# Patient Record
Sex: FEMALE | Race: WHITE | NOT HISPANIC OR LATINO | Employment: FULL TIME | URBAN - METROPOLITAN AREA
[De-identification: names, ages, dates, MRNs, and addresses within clinical notes are randomized per-mention and may not be internally consistent; named-entity substitution may affect disease eponyms.]

---

## 2021-09-07 ENCOUNTER — HOSPITAL ENCOUNTER (EMERGENCY)
Facility: HOSPITAL | Age: 46
Discharge: HOME | End: 2021-09-08
Attending: FAMILY MEDICINE | Admitting: FAMILY MEDICINE
Payer: COMMERCIAL

## 2021-09-07 ENCOUNTER — APPOINTMENT (OUTPATIENT)
Dept: RADIOLOGY | Facility: HOSPITAL | Age: 46
End: 2021-09-07
Attending: FAMILY MEDICINE
Payer: COMMERCIAL

## 2021-09-07 DIAGNOSIS — R06.01 ORTHOPNEA: ICD-10-CM

## 2021-09-07 DIAGNOSIS — E87.6 HYPOKALEMIA: ICD-10-CM

## 2021-09-07 DIAGNOSIS — R07.9 CHEST PAIN, UNSPECIFIED TYPE: ICD-10-CM

## 2021-09-07 DIAGNOSIS — E83.42 HYPOMAGNESEMIA: ICD-10-CM

## 2021-09-07 LAB
ANION GAP SERPL CALCULATED.3IONS-SCNC: 8 MMOL/L (ref 5–18)
ATRIAL RATE - MUSE: 48 BPM
BASOPHILS # BLD AUTO: 0.1 10E3/UL (ref 0–0.2)
BASOPHILS NFR BLD AUTO: 1 %
BNP SERPL-MCNC: 138 PG/ML (ref 0–64)
BUN SERPL-MCNC: 8 MG/DL (ref 8–22)
CALCIUM SERPL-MCNC: 8.3 MG/DL (ref 8.5–10.5)
CHLORIDE BLD-SCNC: 107 MMOL/L (ref 98–107)
CO2 SERPL-SCNC: 24 MMOL/L (ref 22–31)
CREAT SERPL-MCNC: 0.71 MG/DL (ref 0.6–1.1)
D DIMER PPP FEU-MCNC: 0.64 UG/ML FEU (ref 0–0.5)
DIASTOLIC BLOOD PRESSURE - MUSE: NORMAL MMHG
EOSINOPHIL # BLD AUTO: 0.2 10E3/UL (ref 0–0.7)
EOSINOPHIL NFR BLD AUTO: 2 %
ERYTHROCYTE [DISTWIDTH] IN BLOOD BY AUTOMATED COUNT: 13.6 % (ref 10–15)
GFR SERPL CREATININE-BSD FRML MDRD: >90 ML/MIN/1.73M2
GLUCOSE BLD-MCNC: 79 MG/DL (ref 70–125)
HCT VFR BLD AUTO: 32 % (ref 35–47)
HGB BLD-MCNC: 10.9 G/DL (ref 11.7–15.7)
IMM GRANULOCYTES # BLD: 0 10E3/UL
IMM GRANULOCYTES NFR BLD: 0 %
INTERPRETATION ECG - MUSE: NORMAL
LYMPHOCYTES # BLD AUTO: 2.3 10E3/UL (ref 0.8–5.3)
LYMPHOCYTES NFR BLD AUTO: 31 %
MAGNESIUM SERPL-MCNC: 1.6 MG/DL (ref 1.8–2.6)
MCH RBC QN AUTO: 32.2 PG (ref 26.5–33)
MCHC RBC AUTO-ENTMCNC: 34.1 G/DL (ref 31.5–36.5)
MCV RBC AUTO: 94 FL (ref 78–100)
MONOCYTES # BLD AUTO: 0.6 10E3/UL (ref 0–1.3)
MONOCYTES NFR BLD AUTO: 9 %
NEUTROPHILS # BLD AUTO: 4.2 10E3/UL (ref 1.6–8.3)
NEUTROPHILS NFR BLD AUTO: 57 %
NRBC # BLD AUTO: 0 10E3/UL
NRBC BLD AUTO-RTO: 0 /100
P AXIS - MUSE: 35 DEGREES
PLATELET # BLD AUTO: 224 10E3/UL (ref 150–450)
POTASSIUM BLD-SCNC: 3.2 MMOL/L (ref 3.5–5)
PR INTERVAL - MUSE: 182 MS
QRS DURATION - MUSE: 86 MS
QT - MUSE: 424 MS
QTC - MUSE: 378 MS
R AXIS - MUSE: 78 DEGREES
RBC # BLD AUTO: 3.39 10E6/UL (ref 3.8–5.2)
SODIUM SERPL-SCNC: 139 MMOL/L (ref 136–145)
SYSTOLIC BLOOD PRESSURE - MUSE: NORMAL MMHG
T AXIS - MUSE: 34 DEGREES
TROPONIN I SERPL-MCNC: <0.01 NG/ML (ref 0–0.29)
VENTRICULAR RATE- MUSE: 48 BPM
WBC # BLD AUTO: 7.4 10E3/UL (ref 4–11)

## 2021-09-07 PROCEDURE — 71046 X-RAY EXAM CHEST 2 VIEWS: CPT

## 2021-09-07 PROCEDURE — 80048 BASIC METABOLIC PNL TOTAL CA: CPT | Performed by: FAMILY MEDICINE

## 2021-09-07 PROCEDURE — 84484 ASSAY OF TROPONIN QUANT: CPT | Performed by: FAMILY MEDICINE

## 2021-09-07 PROCEDURE — 99285 EMERGENCY DEPT VISIT HI MDM: CPT | Mod: 25

## 2021-09-07 PROCEDURE — 83735 ASSAY OF MAGNESIUM: CPT | Performed by: FAMILY MEDICINE

## 2021-09-07 PROCEDURE — 85025 COMPLETE CBC W/AUTO DIFF WBC: CPT | Performed by: FAMILY MEDICINE

## 2021-09-07 PROCEDURE — 93005 ELECTROCARDIOGRAM TRACING: CPT

## 2021-09-07 PROCEDURE — 87635 SARS-COV-2 COVID-19 AMP PRB: CPT | Performed by: FAMILY MEDICINE

## 2021-09-07 PROCEDURE — 93005 ELECTROCARDIOGRAM TRACING: CPT | Performed by: FAMILY MEDICINE

## 2021-09-07 PROCEDURE — 85379 FIBRIN DEGRADATION QUANT: CPT | Performed by: FAMILY MEDICINE

## 2021-09-07 PROCEDURE — 36415 COLL VENOUS BLD VENIPUNCTURE: CPT | Performed by: FAMILY MEDICINE

## 2021-09-07 PROCEDURE — 83880 ASSAY OF NATRIURETIC PEPTIDE: CPT | Performed by: FAMILY MEDICINE

## 2021-09-07 RX ORDER — IPRATROPIUM BROMIDE AND ALBUTEROL SULFATE 2.5; .5 MG/3ML; MG/3ML
3 SOLUTION RESPIRATORY (INHALATION) ONCE
Status: DISCONTINUED | OUTPATIENT
Start: 2021-09-07 | End: 2021-09-08 | Stop reason: HOSPADM

## 2021-09-07 ASSESSMENT — MIFFLIN-ST. JEOR: SCORE: 1124.42

## 2021-09-07 NOTE — Clinical Note
Jocelyne Angel was seen and treated in our emergency department on 9/7/2021.  She may return to work on 09/09/2021.       If you have any questions or concerns, please don't hesitate to call.      Ivan Dodson MD

## 2021-09-08 ENCOUNTER — APPOINTMENT (OUTPATIENT)
Dept: ULTRASOUND IMAGING | Facility: HOSPITAL | Age: 46
End: 2021-09-08
Attending: FAMILY MEDICINE
Payer: COMMERCIAL

## 2021-09-08 VITALS
WEIGHT: 116 LBS | OXYGEN SATURATION: 100 % | TEMPERATURE: 98.5 F | HEART RATE: 46 BPM | DIASTOLIC BLOOD PRESSURE: 97 MMHG | BODY MASS INDEX: 21.35 KG/M2 | HEIGHT: 62 IN | RESPIRATION RATE: 16 BRPM | SYSTOLIC BLOOD PRESSURE: 145 MMHG

## 2021-09-08 LAB — SARS-COV-2 RNA RESP QL NAA+PROBE: NEGATIVE

## 2021-09-08 PROCEDURE — 250N000013 HC RX MED GY IP 250 OP 250 PS 637: Performed by: FAMILY MEDICINE

## 2021-09-08 PROCEDURE — 93970 EXTREMITY STUDY: CPT

## 2021-09-08 RX ORDER — POTASSIUM CHLORIDE 1.5 G/1.58G
20 POWDER, FOR SOLUTION ORAL ONCE
Status: COMPLETED | OUTPATIENT
Start: 2021-09-08 | End: 2021-09-08

## 2021-09-08 RX ORDER — MAGNESIUM OXIDE 400 MG/1
400 TABLET ORAL ONCE
Status: COMPLETED | OUTPATIENT
Start: 2021-09-08 | End: 2021-09-08

## 2021-09-08 RX ADMIN — MAGNESIUM OXIDE TAB 400 MG (241.3 MG ELEMENTAL MG) 400 MG: 400 (241.3 MG) TAB at 02:09

## 2021-09-08 RX ADMIN — POTASSIUM CHLORIDE FOR ORAL SOLUTION 20 MEQ: 1.5 POWDER, FOR SOLUTION ORAL at 02:09

## 2021-09-08 ASSESSMENT — ENCOUNTER SYMPTOMS
COUGH: 0
FEVER: 0
CHEST TIGHTNESS: 1
CHILLS: 0

## 2021-09-08 NOTE — ED PROVIDER NOTES
EMERGENCY DEPARTMENT ENCOUNTER      NAME: Jocelyne Angel  AGE: 46 year old female  YOB: 1975  MRN: [id number]  EVALUATION DATE & TIME: No admission date for patient encounter.    PCP: No primary care provider on file.    ED PROVIDER: Ivan Dodson M.D.    Chief Complaint   Patient presents with     Chest Pain       FINAL IMPRESSION:  1. Chest pain, unspecified type    2. Orthopnea    3. Hypomagnesemia    4. Hypokalemia        ED COURSE & MEDICAL DECISION MAKING:    Pertinent Labs & Imaging studies personally reviewed and interpreted by me. (See chart for details)  9:33 PM Patient seen and examined, prior records reviewed. PPE worn throughout all patient encounters; surgical mask, gloves.  Differential diagnosis includes but not limited to COPD, asthma, CHF, pneumonia, bronchitis, pneumothorax, myocardial infarction, pulmonary embolism, anxiety.  Patient presents with a week of shortness of breath and mild bilateral lower extremity swelling.  Patient was seen for this same 2 days ago, had labs and a CT PE protocol that was negative for acute pulmonary embolism.  Labs ordered, EKG is reassuring.  11:23 PM labs are reassuring, troponin is negative.  Mild anemia, mild hypokalemia and hypomagnesemia.  Chest x-ray without findings for heart failure, BNP is minimally elevated.  Bilateral lower extremity edema with positive D-dimer, ultrasound is ordered to further evaluate.  Patient refused her nebulizer treatment.  Given the patient's had ongoing symptoms and had a negative troponin a couple days ago, single troponin is adequate for rule out.  She does have some borderline bradycardia.  2:02 AM lower extremity ultrasound is negative for acute findings.  Patient is stable for discharge with outpatient follow-up with cardiology.  Consider echocardiogram.  Review of prior CT scan demonstrates peribronchial cuffing consistent with bronchitis.  Patient does not believe that the shortness of breath and  chest pressure she is having are from her lungs and does not want nebulizer treatments or further treatment for bronchitis       At the conclusion of the encounter I discussed the results of all of the tests and the disposition. The questions were answered. The patient or family acknowledged understanding and was agreeable with the care plan.     PROCEDURES:   Procedures    MEDICATIONS GIVEN IN THE EMERGENCY:  Medications   ipratropium - albuterol 0.5 mg/2.5 mg/3 mL (DUONEB) neb solution 3 mL (0 mLs Nebulization Hold 9/7/21 2325)   potassium chloride (KLOR-CON) Packet 20 mEq (20 mEq Oral Given 9/8/21 0209)   magnesium oxide (MAG-OX) tablet 400 mg (400 mg Oral Given 9/8/21 0209)       NEW PRESCRIPTIONS STARTED AT TODAY'S ER VISIT  Discharge Medication List as of 9/8/2021  2:13 AM          =================================================================    HPI    Patient information was obtained from: patient      Jocelyne Angel is a 46 year old female with a pertinent history of asthma who presents to this ED for evaluation of chest discomfort. Patient reports that one week ago she began having ankle swelling and chest heaviness. She states that she finds it hard to breath and lay down due to her chest discomfort. She denies fever, chills, cough, recent cold symptoms, or any additional symptoms at this time. Patient has reported hx of anemia, no regular medications, and allergy to Percocet and Tylenol. She took Advil today to manage her symptoms. She supposed to see her primary on Thursday. Patient is not COVID vaccinated.     REVIEW OF SYSTEMS   Review of Systems   Constitutional: Negative for chills and fever.   HENT:        Negative for cold symptoms   Respiratory: Positive for chest tightness. Negative for cough.         Positive for dyspnea   Musculoskeletal:        Positive for bilateral ankle swelling     All other systems reviewed and negative    PAST MEDICAL HISTORY:  Past Medical History:   Diagnosis  Date     Abnormal glandular Papanicolaou smear of cervix 2002 2002 with Colpo. Normal since     Chlamydia trachomatis infection of other specified site 2004    Tx'd     Depressive disorder, not elsewhere classified 2005    On Lexapro x 2weeks. PPD?     Mild intermittent asthma     Asthma, albuterol inhaler     Mitral valve disorders(424.0)     Mild, No tx, No sx.       PAST SURGICAL HISTORY:  Past Surgical History:   Procedure Laterality Date     C LIGATE FALLOPIAN TUBE      Description: Tubal Ligation;  Recorded: 03/26/2012;     HC DILATION/CURETTAGE DIAG/THER NON OB  2001    D & C, after SAB     SHOULDER SURGERY       SURGICAL HISTORY OF -   12/2/05    rotator cuff repair       CURRENT MEDICATIONS:    Current Facility-Administered Medications   Medication     ipratropium - albuterol 0.5 mg/2.5 mg/3 mL (DUONEB) neb solution 3 mL     Current Outpatient Medications   Medication     BUPROPION HCL### 150 MG OR TBCR     BUSPAR 15 MG OR TABS     CELEXA 40 MG OR TABS     TEMAZEPAM 15 MG OR CAPS     XANAX 0.25 MG OR TABS       ALLERGIES:  Allergies   Allergen Reactions     Percocet [Oxycodone-Acetaminophen] Hives     Tylenol/Codeine [Codeine] Itching       FAMILY HISTORY:  Family History   Problem Relation Age of Onset     Cerebrovascular Disease Paternal Grandmother      C.A.D. Paternal Grandmother         mi     Cancer Paternal Grandfather      Genitourinary Problems Mother         renal stones       SOCIAL HISTORY:   Social History     Socioeconomic History     Marital status: Single     Spouse name: Not on file     Number of children: 2     Years of education: Not on file     Highest education level: Not on file   Occupational History     Employer: UNEMPLOYED   Tobacco Use     Smoking status: Light Tobacco Smoker     Packs/day: 0.10     Types: Cigarettes     Tobacco comment: still socially   Substance and Sexual Activity     Alcohol use: Yes     Comment: Alcoholic Drinks/day: Occassionally      Drug use: No      "Sexual activity: Yes     Partners: Male     Birth control/protection: Condom, Pill     Comment: partner uses comdom. Hx chlamydia last pregnancy.    Other Topics Concern     Not on file   Social History Narrative    Caffeine intake/servings daily - 0-1    Calcium intake/servings daily - 3    Exercise active times weekly - describe     Sunscreen used - Yes    Seatbelts used - Yes    Guns stored in the home - No    Self Breast Exam - Yes    Pap test up to date -  No    Eye exam up to date -  Yes    Dental exam up to date -  Yes    DEXA scan up to date -  Not Applicable    Flex Sig/Colonoscopy up to date -  Not Applicable    Mammography up to date -  Not Applicable    Immunizations reviewed and up to date - Yes    Abuse: Current or Past (Physical, Sexual or Emotional) - No    Do you feel safe in your environment - Yes    Do you cope well with stress - No    Do you suffer from insomnia - Yes    Last updated by: Mckayla Gan  3/14/2005     Social Determinants of Health     Financial Resource Strain:      Difficulty of Paying Living Expenses:    Food Insecurity:      Worried About Running Out of Food in the Last Year:      Ran Out of Food in the Last Year:    Transportation Needs:      Lack of Transportation (Medical):      Lack of Transportation (Non-Medical):    Physical Activity:      Days of Exercise per Week:      Minutes of Exercise per Session:    Stress:      Feeling of Stress :    Social Connections:      Frequency of Communication with Friends and Family:      Frequency of Social Gatherings with Friends and Family:      Attends Anabaptism Services:      Active Member of Clubs or Organizations:      Attends Club or Organization Meetings:      Marital Status:    Intimate Partner Violence:      Fear of Current or Ex-Partner:      Emotionally Abused:      Physically Abused:      Sexually Abused:        VITALS:  BP (!) 145/97   Pulse (!) 46   Temp 98.5  F (36.9  C) (Temporal)   Resp 16   Ht 1.575 m (5' 2\")   Wt " 52.6 kg (116 lb)   LMP 09/01/2021   SpO2 100%   BMI 21.22 kg/m      PHYSICAL EXAM:  Physical Exam  Vitals and nursing note reviewed.   Constitutional:       Appearance: Normal appearance.   HENT:      Head: Normocephalic and atraumatic.      Right Ear: External ear normal.      Left Ear: External ear normal.      Nose: Nose normal.      Mouth/Throat:      Mouth: Mucous membranes are moist.   Eyes:      Extraocular Movements: Extraocular movements intact.      Conjunctiva/sclera: Conjunctivae normal.      Pupils: Pupils are equal, round, and reactive to light.   Cardiovascular:      Rate and Rhythm: Normal rate and regular rhythm.   Pulmonary:      Effort: Pulmonary effort is normal.      Breath sounds: No wheezing or rales.      Comments: Decreased breathe sounds bilaterally.  Abdominal:      General: Abdomen is flat. There is no distension.      Palpations: Abdomen is soft.      Tenderness: There is no abdominal tenderness. There is no guarding.   Musculoskeletal:         General: Normal range of motion.      Cervical back: Normal range of motion and neck supple.      Right lower leg: No edema.      Left lower leg: No edema.   Lymphadenopathy:      Cervical: No cervical adenopathy.   Skin:     General: Skin is warm and dry.   Neurological:      General: No focal deficit present.      Mental Status: She is alert and oriented to person, place, and time. Mental status is at baseline.      Comments: No gross focal neurologic deficits   Psychiatric:         Mood and Affect: Mood normal.         Behavior: Behavior normal.         Thought Content: Thought content normal.       LAB:  All pertinent labs reviewed and interpreted.  Results for orders placed or performed during the hospital encounter of 09/07/21   Chest XR,  PA & LAT    Impression    IMPRESSION: Bronchial wall thickening noted on CT not evident by plain film. No focal infiltrate. Heart and central vessels unremarkable. Remote right and left rotator cuff  cuff repair.   US Lower Extremity Venous Duplex Bilateral    Impression    IMPRESSION:  1.  No deep venous thrombosis in the bilateral lower extremities.   Basic metabolic panel   Result Value Ref Range    Sodium 139 136 - 145 mmol/L    Potassium 3.2 (L) 3.5 - 5.0 mmol/L    Chloride 107 98 - 107 mmol/L    Carbon Dioxide (CO2) 24 22 - 31 mmol/L    Anion Gap 8 5 - 18 mmol/L    Urea Nitrogen 8 8 - 22 mg/dL    Creatinine 0.71 0.60 - 1.10 mg/dL    Calcium 8.3 (L) 8.5 - 10.5 mg/dL    Glucose 79 70 - 125 mg/dL    GFR Estimate >90 >60 mL/min/1.73m2   Result Value Ref Range    Troponin I <0.01 0.00 - 0.29 ng/mL   B-Type Natriuretic Peptide ([facility] Only)   Result Value Ref Range     (H) 0 - 64 pg/mL   Result Value Ref Range    Magnesium 1.6 (L) 1.8 - 2.6 mg/dL   Symptomatic COVID-19 Virus (Coronavirus) by PCR Nasopharyngeal    Specimen: Nasopharyngeal; Swab   Result Value Ref Range    SARS CoV2 PCR Negative Negative   D dimer quantitative   Result Value Ref Range    D-Dimer Quantitative 0.64 (H) 0.00 - 0.50 ug/mL FEU   CBC with platelets and differential   Result Value Ref Range    WBC Count 7.4 4.0 - 11.0 10e3/uL    RBC Count 3.39 (L) 3.80 - 5.20 10e6/uL    Hemoglobin 10.9 (L) 11.7 - 15.7 g/dL    Hematocrit 32.0 (L) 35.0 - 47.0 %    MCV 94 78 - 100 fL    MCH 32.2 26.5 - 33.0 pg    MCHC 34.1 31.5 - 36.5 g/dL    RDW 13.6 10.0 - 15.0 %    Platelet Count 224 150 - 450 10e3/uL    % Neutrophils 57 %    % Lymphocytes 31 %    % Monocytes 9 %    % Eosinophils 2 %    % Basophils 1 %    % Immature Granulocytes 0 %    NRBCs per 100 WBC 0 <1 /100    Absolute Neutrophils 4.2 1.6 - 8.3 10e3/uL    Absolute Lymphocytes 2.3 0.8 - 5.3 10e3/uL    Absolute Monocytes 0.6 0.0 - 1.3 10e3/uL    Absolute Eosinophils 0.2 0.0 - 0.7 10e3/uL    Absolute Basophils 0.1 0.0 - 0.2 10e3/uL    Absolute Immature Granulocytes 0.0 <=0.0 10e3/uL    Absolute NRBCs 0.0 10e3/uL   ECG 12-LEAD WITH MUSE ([facility])   Result Value Ref Range    Systolic Blood Pressure   mmHg    Diastolic Blood Pressure  mmHg    Ventricular Rate 48 BPM    Atrial Rate 48 BPM    NV Interval 182 ms    QRS Duration 86 ms     ms    QTc 378 ms    P Axis 35 degrees    R AXIS 78 degrees    T Axis 34 degrees    Interpretation ECG       Sinus bradycardia  Abnormal ECG  When compared with ECG of 08-APR-2006 16:45,  Vent. rate has decreased BY  50 BPM  T wave inversion no longer evident in Inferior leads  T wave inversion no longer evident in Anterior leads  QT has shortened  Confirmed by SEE ED PROVIDER NOTE FOR, ECG INTERPRETATION ([id number]),  GISELLE VILLANUEVA ([id number]) on 9/7/2021 10:42:15 PM         RADIOLOGY:  Reviewed all pertinent imaging. Please see official radiology report.  US Lower Extremity Venous Duplex Bilateral   Final Result   IMPRESSION:   1.  No deep venous thrombosis in the bilateral lower extremities.      Chest XR,  PA & LAT   Final Result   IMPRESSION: Bronchial wall thickening noted on CT not evident by plain film. No focal infiltrate. Heart and central vessels unremarkable. Remote right and left rotator cuff cuff repair.          EKG:    Performed at: 11:13 PM  Impression: Sinus bradycardia, otherwise normal  Rate: 48  Rhythm: Sinus  Axis: Normal  NV Interval: 182  QRS Interval: 86  QTc Interval: 378  ST Changes: No acute ischemic changes  Comparison: Prior of April 2006, rate has decreased and previously seen T wave inversions are absent    I have independently reviewed and interpreted the EKG(s) documented above.    I, Sarai De La Vega, am serving as a scribe to document services personally performed by Dr. Dodson based on my observation and the provider's statements to me. I, Ivan Dodson MD attest that Sarai De La Vega is acting in a scribe capacity, has observed my performance of the services and has documented them in accordance with my direction.    Ivan Dodson M.D.  Emergency Medicine  [facility] EMERGENCY  DEPARTMENT  [address] [place] [id number]  [phone]  Dept: [phone]     Ivan Dodson MD  09/08/21 0204       Ivan Dodson MD  09/08/21 9426

## 2021-09-10 ENCOUNTER — OFFICE VISIT (OUTPATIENT)
Dept: CARDIOLOGY | Facility: CLINIC | Age: 46
End: 2021-09-10
Attending: FAMILY MEDICINE
Payer: COMMERCIAL

## 2021-09-10 VITALS
DIASTOLIC BLOOD PRESSURE: 74 MMHG | HEART RATE: 64 BPM | RESPIRATION RATE: 14 BRPM | WEIGHT: 123.9 LBS | BODY MASS INDEX: 22.66 KG/M2 | SYSTOLIC BLOOD PRESSURE: 122 MMHG

## 2021-09-10 DIAGNOSIS — R07.9 CHEST PAIN, UNSPECIFIED TYPE: Primary | ICD-10-CM

## 2021-09-10 DIAGNOSIS — I34.1 MITRAL VALVE PROLAPSE: ICD-10-CM

## 2021-09-10 DIAGNOSIS — R06.01 ORTHOPNEA: ICD-10-CM

## 2021-09-10 PROCEDURE — 99204 OFFICE O/P NEW MOD 45 MIN: CPT | Performed by: INTERNAL MEDICINE

## 2021-09-10 RX ORDER — DIPHENOXYLATE HYDROCHLORIDE AND ATROPINE SULFATE 2.5; .025 MG/1; MG/1
1 TABLET ORAL
COMMUNITY

## 2021-09-10 NOTE — PATIENT INSTRUCTIONS
1. Continue your current medications  2. Set up echo to re-assess mitral valve prolapse.  3. OK to return to work without restrictions if echo is unrevealing.

## 2021-09-10 NOTE — PROGRESS NOTES
Thank you, Dr. Ivan Dodson, for asking the [facility] team to see Ms. Jocelyne Angel in the rapid access clinic to evaluate orthopnea, peripheral edema and mildly elevated BNP.    Assessment/Recommendations   Assessment:    1.  Orthopnea, peripheral edema mildly elevated BNP, possibly indicative of underlying heart failure although no evidence of pulmonary edema on chest x-ray or chest CT PE run done recently.  She does report a history of mitral valve prolapse diagnosed more than 20 years ago but no subsequent follow-up.  Her examination does not suggest prominent mitral regurgitation murmur but I did suggest an echocardiogram to further evaluate.  At this point, she appears euvolemic so we will hold on initiation of diuretic therapy.  2.  Palpitations, etiology unclear.  She did wear a Holter monitor recently which demonstrated rare PACs and PVCs.  Her symptoms of chest tightness and palpitations correlated with sinus rhythm without ectopy.  3.  Chest tightness, etiology unclear.  ECG x2 troponins all within normal limits.  No significant risk factors for early coronary artery disease except her father at age 50.  May elect to pursue stress testing if echocardiogram is unrevealing.    Plan:  1.  Continue current medications  2.  Schedule echocardiogram to follow-up on mitral valve disease.  If this is unremarkable, will pursue stress study.       History of Present Illness    Ms. Jocelyne Angel is a 46 year old female with history of joint hypermobility and reported mitral valve prolapse documented on an echocardiogram 20 years ago (report not available) who presents to the rapid access clinic today at the request of Dr. Dodson.  She has been seen on 2 separate occasions in the ED, once in the [facility] system and once in our system for symptoms of orthopnea, chest tightness and lower extremity edema.  On both occasions, her ECGs have been normal without evidence of ischemia.   She did undergo a chest CT PE run at [facility] which demonstrated no evidence of pulmonary embolus but did suggest some peribronchial cuffing which may indicate bronchitis.  She was advised to try nebulizer but noted no improvement.  At the time of her initial evaluation at [facility], her BNP was normal; however, a repeat BNP in our system was mildly elevated.  Given the lack of any pulmonary edema on chest x-ray, no therapy was initiated and she is referred here for further evaluation.  She was given a Holter following her [facility] visit which has since demonstrated sinus rhythm with rare PACs and PVCs.  2 reports of chest tightness and palpitations were associated with sinus rhythm without ectopy.    ECG (personally reviewed): ECG demonstrates normal sinus rhythm without ischemic changes    Cardiac Imaging Studies (personally reviewed): No recent cardiac imaging     Physical Examination Review of Systems   /74 (BP Location: Left arm, Patient Position: Sitting, Cuff Size: Adult Regular)   Pulse 64   Resp 14   Wt 56.2 kg (123 lb 14.4 oz)   LMP 09/01/2021   BMI 22.66 kg/m    Body mass index is 22.66 kg/m .  Wt Readings from Last 3 Encounters:   09/10/21 56.2 kg (123 lb 14.4 oz)   09/07/21 52.6 kg (116 lb)   09/28/12 54.4 kg (120 lb)     General Appearance:   Awake, Alert, No acute distress.   HEENT:  No scleral icterus; the mucous membranes were pink and moist.   Neck: No cervical bruits or jugular venous distention    Chest: The spine was straight. The chest was symmetric.   Lungs:   Respirations unlabored; the lungs are clear to auscultation. No wheezing   Cardiovascular:    Regular rate and rhythm.  S1, S2 normal.  No obvious midsystolic click or murmur.   Abdomen:  No organomegaly, masses, bruits, or tenderness. Bowels sounds are present   Extremities:  No peripheral edema, clubbing or cyanosis.  Distal pulses are symmetric.   Skin: No xanthelasma. Warm, Dry.   Musculoskeletal: No tenderness.   Neurologic: Mood  and affect are appropriate.    Enc Vitals  BP: 122/74  Pulse: 64  Resp: 14  Weight: 56.2 kg (123 lb 14.4 oz)                                         Medical History  Surgical History Family History Social History   Past Medical History:   Diagnosis Date     Abnormal glandular Papanicolaou smear of cervix 2002 with Colpo. Normal since     Chlamydia trachomatis infection of other specified site     Tx'd     Depressive disorder, not elsewhere classified 2005    On Lexapro x 2weeks. PPD?     Mild intermittent asthma     Asthma, albuterol inhaler     Mitral valve disorders(424.0)     Mild, No tx, No sx.    Past Surgical History:   Procedure Laterality Date     C LIGATE FALLOPIAN TUBE      Description: Tubal Ligation;  Recorded: 2012;     HC DILATION/CURETTAGE DIAG/THER NON OB      D & C, after SAB     SHOULDER SURGERY       SURGICAL HISTORY OF -   05    rotator cuff repair    Family History   Problem Relation Age of Onset     Genitourinary Problems Mother         renal stones     Coronary Artery Disease Father 50        CABG     Cerebrovascular Disease Paternal Grandmother      C.A.D. Paternal Grandmother         mi     Cancer Paternal Grandfather     Social History     Socioeconomic History     Marital status: Single     Spouse name: Not on file     Number of children: 2     Years of education: Not on file     Highest education level: Not on file   Occupational History     Employer: UNEMPLOYED   Tobacco Use     Smoking status: Former Smoker     Packs/day: 0.10     Types: Cigarettes     Quit date: 2021     Years since quittin.4     Smokeless tobacco: Never Used     Tobacco comment: still socially   Substance and Sexual Activity     Alcohol use: Yes     Comment: Alcoholic Drinks/day: Occassionally      Drug use: No     Sexual activity: Yes     Partners: Male     Birth control/protection: Condom, Pill     Comment: partner uses comdom. Hx chlamydia last pregnancy.    Other Topics Concern      Parent/sibling w/ CABG, MI or angioplasty before 65F 55M? Not Asked   Social History Narrative    Caffeine intake/servings daily - 0-1    Calcium intake/servings daily - 3    Exercise active times weekly - describe     Sunscreen used - Yes    Seatbelts used - Yes    Guns stored in the home - No    Self Breast Exam - Yes    Pap test up to date -  No    Eye exam up to date -  Yes    Dental exam up to date -  Yes    DEXA scan up to date -  Not Applicable    Flex Sig/Colonoscopy up to date -  Not Applicable    Mammography up to date -  Not Applicable    Immunizations reviewed and up to date - Yes    Abuse: Current or Past (Physical, Sexual or Emotional) - No    Do you feel safe in your environment - Yes    Do you cope well with stress - No    Do you suffer from insomnia - Yes    Last updated by: Mckayla Gan  3/14/2005     Social Determinants of Health     Financial Resource Strain:      Difficulty of Paying Living Expenses:    Food Insecurity:      Worried About Running Out of Food in the Last Year:      Ran Out of Food in the Last Year:    Transportation Needs:      Lack of Transportation (Medical):      Lack of Transportation (Non-Medical):    Physical Activity:      Days of Exercise per Week:      Minutes of Exercise per Session:    Stress:      Feeling of Stress :    Social Connections:      Frequency of Communication with Friends and Family:      Frequency of Social Gatherings with Friends and Family:      Attends Zoroastrian Services:      Active Member of Clubs or Organizations:      Attends Club or Organization Meetings:      Marital Status:    Intimate Partner Violence:      Fear of Current or Ex-Partner:      Emotionally Abused:      Physically Abused:      Sexually Abused:           Medications  Allergies   Current Outpatient Medications   Medication Sig Dispense Refill     Multiple Vitamin (MULTI-VITAMINS) TABS Take 1 tablet by mouth       BUPROPION HCL### 150 MG OR TBCR 1 TABLET TWICE DAILY (Patient  not taking: Reported on 9/10/2021) 60 0     BUSPAR 15 MG OR TABS 1 TABLET 3 TIMES DAILY (Patient not taking: No sig reported) 90 0     CELEXA 40 MG OR TABS 1 TABLET DAILY (Patient not taking: Reported on 9/10/2021) 30 11     TEMAZEPAM 15 MG OR CAPS 1 -2CAPSULE AT BEDTIME AS NEEDED (Patient not taking: Reported on 9/10/2021) 60 0     XANAX 0.25 MG OR TABS 1 TAB PO TID (Three times per day) AS DIRECTED (Patient not taking: Reported on 9/10/2021) 90 0      Allergies   Allergen Reactions     Percocet [Oxycodone-Acetaminophen] Hives     Tylenol/Codeine [Codeine] Itching         Lab Results    Chemistry/lipid CBC Cardiac Enzymes/BNP/TSH/INR   Recent Labs   Lab Test 09/07/21  2223   BUN 8      CO2 24    Recent Labs   Lab Test 09/07/21  2223   WBC 7.4   HGB 10.9*   HCT 32.0*   MCV 94       Recent Labs   Lab Test 09/07/21  2224 09/07/21  2223   TROPONINI  --  <0.01   *  --         A total of 40 minutes was spent reviewing patient's medical records, obtaining history and performing examination, as well as discussing diagnoses/ recommendations with patient and answering all questions.

## 2021-09-10 NOTE — LETTER
9/10/2021    Jeanne Ruiz PA-C  [facility] [address]  [facility] [address]    RE: Jocelyne Angel       Dear Colleague,    I had the pleasure of seeing Jocelyne Angel in the [facility].        Thank you, Dr. Ivan Dodson, for asking the [facility] team to see Ms. Jocelyne Angel in the rapid access clinic to evaluate orthopnea, peripheral edema and mildly elevated BNP.    Assessment/Recommendations   Assessment:    1.  Orthopnea, peripheral edema mildly elevated BNP, possibly indicative of underlying heart failure although no evidence of pulmonary edema on chest x-ray or chest CT PE run done recently.  She does report a history of mitral valve prolapse diagnosed more than 20 years ago but no subsequent follow-up.  Her examination does not suggest prominent mitral regurgitation murmur but I did suggest an echocardiogram to further evaluate.  At this point, she appears euvolemic so we will hold on initiation of diuretic therapy.  2.  Palpitations, etiology unclear.  She did wear a Holter monitor recently which demonstrated rare PACs and PVCs.  Her symptoms of chest tightness and palpitations correlated with sinus rhythm without ectopy.  3.  Chest tightness, etiology unclear.  ECG x2 troponins all within normal limits.  No significant risk factors for early coronary artery disease except her father at age 50.  May elect to pursue stress testing if echocardiogram is unrevealing.    Plan:  1.  Continue current medications  2.  Schedule echocardiogram to follow-up on mitral valve disease.  If this is unremarkable, will pursue stress study.       History of Present Illness    Ms. Jocelyne Angel is a 46 year old female with history of joint hypermobility and reported mitral valve prolapse documented on an echocardiogram 20 years ago (report not available) who presents to the [facility] access clinic today  at the request of Dr. Dodson.  She has been seen on 2 separate occasions in the ED, once in the [facility] system and once in our system for symptoms of orthopnea, chest tightness and lower extremity edema.  On both occasions, her ECGs have been normal without evidence of ischemia.  She did undergo a chest CT PE run at [facility] which demonstrated no evidence of pulmonary embolus but did suggest some peribronchial cuffing which may indicate bronchitis.  She was advised to try nebulizer but noted no improvement.  At the time of her initial evaluation at [facility], her BNP was normal; however, a repeat BNP in our system was mildly elevated.  Given the lack of any pulmonary edema on chest x-ray, no therapy was initiated and she is referred here for further evaluation.  She was given a Holter following her [facility] visit which has since demonstrated sinus rhythm with rare PACs and PVCs.  2 reports of chest tightness and palpitations were associated with sinus rhythm without ectopy.    ECG (personally reviewed): ECG demonstrates normal sinus rhythm without ischemic changes    Cardiac Imaging Studies (personally reviewed): No recent cardiac imaging     Physical Examination Review of Systems   /74 (BP Location: Left arm, Patient Position: Sitting, Cuff Size: Adult Regular)   Pulse 64   Resp 14   Wt 56.2 kg (123 lb 14.4 oz)   LMP 09/01/2021   BMI 22.66 kg/m    Body mass index is 22.66 kg/m .  Wt Readings from Last 3 Encounters:   09/10/21 56.2 kg (123 lb 14.4 oz)   09/07/21 52.6 kg (116 lb)   09/28/12 54.4 kg (120 lb)     General Appearance:   Awake, Alert, No acute distress.   HEENT:  No scleral icterus; the mucous membranes were pink and moist.   Neck: No cervical bruits or jugular venous distention    Chest: The spine was straight. The chest was symmetric.   Lungs:   Respirations unlabored; the lungs are clear to auscultation. No wheezing   Cardiovascular:    Regular rate and rhythm.  S1, S2 normal.  No obvious  midsystolic click or murmur.   Abdomen:  No organomegaly, masses, bruits, or tenderness. Bowels sounds are present   Extremities:  No peripheral edema, clubbing or cyanosis.  Distal pulses are symmetric.   Skin: No xanthelasma. Warm, Dry.   Musculoskeletal: No tenderness.   Neurologic: Mood and affect are appropriate.    Enc Vitals  BP: 122/74  Pulse: 64  Resp: 14  Weight: 56.2 kg (123 lb 14.4 oz)                                         Medical History  Surgical History Family History Social History   Past Medical History:   Diagnosis Date     Abnormal glandular Papanicolaou smear of cervix 2002 with Colpo. Normal since     Chlamydia trachomatis infection of other specified site     Tx'd     Depressive disorder, not elsewhere classified     On Lexapro x 2weeks. PPD?     Mild intermittent asthma     Asthma, albuterol inhaler     Mitral valve disorders(424.0)     Mild, No tx, No sx.    Past Surgical History:   Procedure Laterality Date     C LIGATE FALLOPIAN TUBE      Description: Tubal Ligation;  Recorded: 2012;     HC DILATION/CURETTAGE DIAG/THER NON OB      D & C, after SAB     SHOULDER SURGERY       SURGICAL HISTORY OF -   05    rotator cuff repair    Family History   Problem Relation Age of Onset     Genitourinary Problems Mother         renal stones     Coronary Artery Disease Father 50        CABG     Cerebrovascular Disease Paternal Grandmother      C.A.D. Paternal Grandmother         mi     Cancer Paternal Grandfather     Social History     Socioeconomic History     Marital status: Single     Spouse name: Not on file     Number of children: 2     Years of education: Not on file     Highest education level: Not on file   Occupational History     Employer: UNEMPLOYED   Tobacco Use     Smoking status: Former Smoker     Packs/day: 0.10     Types: Cigarettes     Quit date: 2021     Years since quittin.4     Smokeless tobacco: Never Used     Tobacco comment: still socially    Substance and Sexual Activity     Alcohol use: Yes     Comment: Alcoholic Drinks/day: Occassionally      Drug use: No     Sexual activity: Yes     Partners: Male     Birth control/protection: Condom, Pill     Comment: partner uses comdom. Hx chlamydia last pregnancy.    Other Topics Concern     Parent/sibling w/ CABG, MI or angioplasty before 65F 55M? Not Asked   Social History Narrative    Caffeine intake/servings daily - 0-1    Calcium intake/servings daily - 3    Exercise active times weekly - describe     Sunscreen used - Yes    Seatbelts used - Yes    Guns stored in the home - No    Self Breast Exam - Yes    Pap test up to date -  No    Eye exam up to date -  Yes    Dental exam up to date -  Yes    DEXA scan up to date -  Not Applicable    Flex Sig/Colonoscopy up to date -  Not Applicable    Mammography up to date -  Not Applicable    Immunizations reviewed and up to date - Yes    Abuse: Current or Past (Physical, Sexual or Emotional) - No    Do you feel safe in your environment - Yes    Do you cope well with stress - No    Do you suffer from insomnia - Yes    Last updated by: Mckayla Gan  3/14/2005     Social Determinants of Health     Financial Resource Strain:      Difficulty of Paying Living Expenses:    Food Insecurity:      Worried About Running Out of Food in the Last Year:      Ran Out of Food in the Last Year:    Transportation Needs:      Lack of Transportation (Medical):      Lack of Transportation (Non-Medical):    Physical Activity:      Days of Exercise per Week:      Minutes of Exercise per Session:    Stress:      Feeling of Stress :    Social Connections:      Frequency of Communication with Friends and Family:      Frequency of Social Gatherings with Friends and Family:      Attends [employer] Services:      Active Member of Clubs or Organizations:      Attends Club or Organization Meetings:      Marital Status:    Intimate Partner Violence:      Fear of Current or Ex-Partner:       Emotionally Abused:      Physically Abused:      Sexually Abused:           Medications  Allergies   Current Outpatient Medications   Medication Sig Dispense Refill     Multiple Vitamin (MULTI-VITAMINS) TABS Take 1 tablet by mouth       BUPROPION HCL### 150 MG OR TBCR 1 TABLET TWICE DAILY (Patient not taking: Reported on 9/10/2021) 60 0     BUSPAR 15 MG OR TABS 1 TABLET 3 TIMES DAILY (Patient not taking: No sig reported) 90 0     CELEXA 40 MG OR TABS 1 TABLET DAILY (Patient not taking: Reported on 9/10/2021) 30 11     TEMAZEPAM 15 MG OR CAPS 1 -2CAPSULE AT BEDTIME AS NEEDED (Patient not taking: Reported on 9/10/2021) 60 0     XANAX 0.25 MG OR TABS 1 TAB PO TID (Three times per day) AS DIRECTED (Patient not taking: Reported on 9/10/2021) 90 0      Allergies   Allergen Reactions     Percocet [Oxycodone-Acetaminophen] Hives     Tylenol/Codeine [Codeine] Itching         Lab Results    Chemistry/lipid CBC Cardiac Enzymes/BNP/TSH/INR   Recent Labs   Lab Test 09/07/21  2223   BUN 8      CO2 24    Recent Labs   Lab Test 09/07/21  2223   WBC 7.4   HGB 10.9*   HCT 32.0*   MCV 94       Recent Labs   Lab Test 09/07/21  2224 09/07/21  2223   TROPONINI  --  <0.01   *  --         A total of 40 minutes was spent reviewing patient's medical records, obtaining history and performing examination, as well as discussing diagnoses/ recommendations with patient and answering all questions.                          Thank you for allowing me to participate in the care of your patient.      Sincerely,     Nancy Dickens MD     [facility]  cc:   Ivan Dodson MD  [address] [place] [address]

## 2021-10-01 ENCOUNTER — HOSPITAL ENCOUNTER (OUTPATIENT)
Dept: CARDIOLOGY | Facility: HOSPITAL | Age: 46
Discharge: HOME | End: 2021-10-01
Attending: INTERNAL MEDICINE | Admitting: INTERNAL MEDICINE
Payer: COMMERCIAL

## 2021-10-01 DIAGNOSIS — R06.01 ORTHOPNEA: ICD-10-CM

## 2021-10-01 DIAGNOSIS — R07.9 CHEST PAIN, UNSPECIFIED TYPE: ICD-10-CM

## 2021-10-01 DIAGNOSIS — I34.1 MITRAL VALVE PROLAPSE: ICD-10-CM

## 2021-10-01 LAB — LVEF ECHO: NORMAL

## 2021-10-01 PROCEDURE — 93306 TTE W/DOPPLER COMPLETE: CPT | Mod: 26 | Performed by: INTERNAL MEDICINE

## 2021-10-01 PROCEDURE — 93306 TTE W/DOPPLER COMPLETE: CPT

## 2021-10-12 ENCOUNTER — TELEPHONE (OUTPATIENT)
Dept: CARDIOLOGY | Facility: CLINIC | Age: 46
End: 2021-10-12

## 2021-10-12 DIAGNOSIS — R07.9 CHEST PAIN, UNSPECIFIED TYPE: Primary | ICD-10-CM

## 2021-10-12 DIAGNOSIS — R06.01 ORTHOPNEA: ICD-10-CM

## 2021-10-12 NOTE — TELEPHONE ENCOUNTER
Patient inquired if she can resume her normal work activities and explained that Dr. Dickens provided note for her at recent visit instructing her to not exert herself until echo results came back.    Informed patient that question / request for updated letter would be forwarded to Dr. Dickens to address - understanding verbalized.    Please address need for updated letter r.e. work restrictions.  mg

## 2021-10-12 NOTE — TELEPHONE ENCOUNTER
I recommended that she restrict her activities until the echo read results were available.  Her echocardiogram showed normal LV and RV systolic function without wall motion abnormality and no significant valve disease or evidence of pulmonary hypertension.  Based on that, I did recommend a nuclear stress study which has not been done.  I think she could return to work with normal activity level at this point.    JUAN JOSÉ   Statement Selected

## 2021-10-12 NOTE — LETTER
October 13, 2021        To whom it may concern,       It is my medical opinion that Jocelyne can return to work with normal activity level at this point.        Sincerely,      Nancy Dickens MD

## 2021-10-13 NOTE — TELEPHONE ENCOUNTER
Unsuccessful attempt to contact patient on # available in chart - lm with patient's emergency contact requesting patient call back.  mg

## 2021-10-13 NOTE — TELEPHONE ENCOUNTER
Return call to patient - informed her of Dr. Dickens's response/recommendations - patient verbalized understanding and [facility]  mg